# Patient Record
Sex: MALE | Race: WHITE | NOT HISPANIC OR LATINO | URBAN - METROPOLITAN AREA
[De-identification: names, ages, dates, MRNs, and addresses within clinical notes are randomized per-mention and may not be internally consistent; named-entity substitution may affect disease eponyms.]

---

## 2018-05-15 ENCOUNTER — EMERGENCY (EMERGENCY)
Facility: HOSPITAL | Age: 38
LOS: 1 days | Discharge: HOME | End: 2018-05-15
Attending: EMERGENCY MEDICINE | Admitting: EMERGENCY MEDICINE
Payer: COMMERCIAL

## 2018-05-15 VITALS
TEMPERATURE: 98 F | SYSTOLIC BLOOD PRESSURE: 129 MMHG | WEIGHT: 175.05 LBS | HEART RATE: 82 BPM | RESPIRATION RATE: 18 BRPM | OXYGEN SATURATION: 98 % | DIASTOLIC BLOOD PRESSURE: 78 MMHG | HEIGHT: 71 IN

## 2018-05-15 DIAGNOSIS — Z20.3 CONTACT WITH AND (SUSPECTED) EXPOSURE TO RABIES: ICD-10-CM

## 2018-05-15 PROCEDURE — 99282 EMERGENCY DEPT VISIT SF MDM: CPT

## 2018-05-15 RX ORDER — RABIES VACC, HUMAN DIPLOID/PF 2.5 UNIT
1 VIAL (EA) INTRAMUSCULAR ONCE
Qty: 0 | Refills: 0 | Status: COMPLETED | OUTPATIENT
Start: 2018-05-15 | End: 2018-05-15

## 2018-05-15 RX ORDER — HUMAN RABIES VIRUS IMMUNE GLOBULIN 150 [IU]/ML
3493 INJECTION, SOLUTION INTRAMUSCULAR ONCE
Qty: 0 | Refills: 0 | Status: DISCONTINUED | OUTPATIENT
Start: 2018-05-15 | End: 2018-05-15

## 2018-05-15 NOTE — ED PROVIDER NOTE - CHPI ED SYMPTOMS NEG
no purulent drainage/no fever/no drainage/no bleeding/no bleeding at site/no pain/no red streaks/no redness

## 2018-05-15 NOTE — ED ADULT TRIAGE NOTE - CHIEF COMPLAINT QUOTE
" I maybe exposed to rabies while I was in [facility] ago ,"

## 2018-05-15 NOTE — ED PROVIDER NOTE - OBJECTIVE STATEMENT
38 y/o m with h/o anxiety presents to ED with concerns of rabies exposure in [place]. . He state a week ago he was licked by a domestic dog on his right hand and fingers. He report of having a superficial cut over 3rd finger at dorsal aspect while having wearing a band aid. He state of using purell to clean his hand. The owner of the dog stated that the dog had all his vaccine. Dog was friendly ant aggressive or provoked. Patient state incidence was a week ago and he just return back to [place] yesterday. Denies fever, swelling, erythema, discharge, pain, paresis, paresthesia. Patient is able to communicate with the owner of the dog but he feels that concern that perhaps the owner's of the dog was not being truthful.  Patient has been following the [facility] feels he fall on the stage 3 category which requires vaccination.

## 2018-05-15 NOTE — ED PROVIDER NOTE - SKIN AREA #1
dorsal/right hand all fingers skin intact, no acute signs of infection, no swelling, erythema, or purulent drainage. no motor or sensory deficit. NVI.

## 2018-05-15 NOTE — ED PROVIDER NOTE - MEDICAL DECISION MAKING DETAILS
Patient with concern of rabies from visiting outside [place], in [place]. According to pt's reported story patient not meet criteria for rabies vaccine however pt declaring the [facility] recommendations vaccine was offered. Pt was unable to decide and was d/c for pt to evaluate situation further and collect more information that would help him make a decision.

## 2018-05-15 NOTE — ED PROVIDER NOTE - ATTENDING CONTRIBUTION TO CARE
Pt w/ no sig PMHx, 1 week ago while in [place], pt had an open wound on his L hand, and it was licked by a dog. The dog is domesticated and the dog is vaccinated. The dog was behaving normally. The owners stated There was a bandaid over the wound when it was licked. Pt used Purell over the wound. Pt w/ no sig PMHx, 1 week ago while in [place], pt had an open wound on his L hand, which was covered by a bandage, and it was licked by a dog. The pt then used Purell-like substance over the wound. The dog was with its owner, is domesticated and the dog is vaccinated. The dog was behaving normally. The pt has been in contact w/ the dog's owner in [place] since then, and the dog is still acting appropriately. Pt comes requesting Rabies vaccine  VITAL SIGNS: I have reviewed nursing notes and confirm.  CONSTITUTIONAL: Well-developed; well-nourished; in no acute distress.  SKIN: Warm and dry, no acute rash. No wound noted to area of hand where pt states wound previously was, and now healed  PSYCH: Cooperative, extremely anxious  Pt requesting Rabies vaccine. D/w pt he is low risk given: wound covered when dog licked his hand; dog was not acting aggressively, and is still acting normally; dog is domesticated and vaccinated. Pt does not feel comfortable trusting the word of people in a distant foreign country. Pt requesting vaccine despite aforementioned. PT advised of the multiple injections. Pt advised if he if it would make him more comfortable, and does not feel he can continue to be in contact w/ dog's owner, we could provide the vaccine, despite his low risk. Pt then worried about cost. Pt then stating maybe he would take only the vaccine and not the Ig. Pt spent considerable time in the ED and unable to make a decision, despite conversations w/ both myself and LOUIE Blanco. Pt discharged when unable to make a decision and advised he may return at any time if he decides he still wants treatment.

## 2018-05-15 NOTE — ED ADULT NURSE NOTE - OBJECTIVE STATEMENT
Patient concerned for rabies exposure, states was in [facility] last week, had a small cut on fingers of hand which had a bandage on it , states hand was licked by a dog, states that family that owns dog had the dog vaccinated for rabies.

## 2021-06-14 NOTE — ED PROVIDER NOTE - CROS ED MUSC ALL NEG

## 2022-04-06 NOTE — ED PROVIDER NOTE - DISCUSSED CLINICAL AND RADIOLOGICAL FINDINGS WITH, MDM
Detail Level: Simple Procedure To Be Performed At Next Visit: Phototherapy: Narrow Band UVB Introduction Text (Please End With A Colon): The following procedure was deferred: patient